# Patient Record
Sex: MALE | Race: WHITE | NOT HISPANIC OR LATINO | Employment: FULL TIME | URBAN - NONMETROPOLITAN AREA
[De-identification: names, ages, dates, MRNs, and addresses within clinical notes are randomized per-mention and may not be internally consistent; named-entity substitution may affect disease eponyms.]

---

## 2022-12-02 ENCOUNTER — HOSPITAL ENCOUNTER (EMERGENCY)
Facility: HOSPITAL | Age: 27
Discharge: HOME | End: 2022-12-02
Attending: NURSE PRACTITIONER | Admitting: NURSE PRACTITIONER
Payer: COMMERCIAL

## 2022-12-02 VITALS
HEART RATE: 65 BPM | TEMPERATURE: 98 F | OXYGEN SATURATION: 96 % | DIASTOLIC BLOOD PRESSURE: 83 MMHG | SYSTOLIC BLOOD PRESSURE: 150 MMHG | RESPIRATION RATE: 16 BRPM

## 2022-12-02 DIAGNOSIS — B34.9 VIRAL SYNDROME: Primary | ICD-10-CM

## 2022-12-02 LAB
FLUAV RNA SPEC QL NAA+PROBE: NEGATIVE
FLUBV RNA RESP QL NAA+PROBE: NEGATIVE
GROUP A STREP BY PCR: NOT DETECTED
RSV RNA SPEC NAA+PROBE: NEGATIVE
SARS-COV-2 RNA RESP QL NAA+PROBE: NEGATIVE

## 2022-12-02 PROCEDURE — 99213 OFFICE O/P EST LOW 20 MIN: CPT | Performed by: NURSE PRACTITIONER

## 2022-12-02 PROCEDURE — 87651 STREP A DNA AMP PROBE: CPT | Performed by: NURSE PRACTITIONER

## 2022-12-02 PROCEDURE — C9803 HOPD COVID-19 SPEC COLLECT: HCPCS

## 2022-12-02 PROCEDURE — G0463 HOSPITAL OUTPT CLINIC VISIT: HCPCS

## 2022-12-02 PROCEDURE — 87637 SARSCOV2&INF A&B&RSV AMP PRB: CPT | Performed by: NURSE PRACTITIONER

## 2022-12-02 ASSESSMENT — ENCOUNTER SYMPTOMS
NAUSEA: 1
DIARRHEA: 1
ABDOMINAL PAIN: 0
HEADACHES: 1
SORE THROAT: 1
CHILLS: 1
PSYCHIATRIC NEGATIVE: 1
RHINORRHEA: 1
MYALGIAS: 1
VOMITING: 1
FEVER: 1
SHORTNESS OF BREATH: 0
COUGH: 1

## 2022-12-02 NOTE — ED TRIAGE NOTES
Past 4 days having diarrhea, liquidly and past 2 days vomiting.  Reports no energy.   Has cough and 101 fever. Reports body aches all over.

## 2022-12-02 NOTE — Clinical Note
Morgan Hampton was seen and treated in our emergency department on 12/2/2022.  He may return to work on 12/05/2022.       If you have any questions or concerns, please don't hesitate to call.      Roula Gibbs, NP

## 2022-12-02 NOTE — ED PROVIDER NOTES
History     Chief Complaint   Patient presents with     Generalized Body Aches     Diarrhea     HPI  Morgan Hampton is a 27 year old male who presents to urgent care today with complaints of fever (resolved), chills, congestion, ear pain, rhinorrhea, sore throat, cough, nausea and vomiting, myalgia and headache which started 4 days ago.  Staying hydrated, normal output.  No rashes.  Wants COVID, influenza and RSV test.  Patient believes his girlfriend's children got him sick as they have similar symptoms.  Needs work note.  No other concerns.    Allergies:  No Known Allergies    Problem List:    There are no problems to display for this patient.       Past Medical History:    No past medical history on file.    Past Surgical History:    No past surgical history on file.    Family History:    No family history on file.    Social History:  Marital Status:  Single [1]        Medications:    No current outpatient medications on file.    Review of Systems   Constitutional: Positive for chills and fever.   HENT: Positive for congestion, ear pain, rhinorrhea and sore throat.    Respiratory: Positive for cough. Negative for shortness of breath.    Cardiovascular: Negative for chest pain.   Gastrointestinal: Positive for diarrhea (resolved), nausea and vomiting. Negative for abdominal pain.   Genitourinary: Negative for decreased urine volume.   Musculoskeletal: Positive for myalgias.   Skin: Negative for rash.   Neurological: Positive for headaches.   Psychiatric/Behavioral: Negative.      Physical Exam   BP: 150/83  Pulse: 65  Temp: 98  F (36.7  C)  Resp: 16  SpO2: 96 %    Physical Exam  Vitals and nursing note reviewed.   Constitutional:       General: He is not in acute distress.     Appearance: Normal appearance. He is not ill-appearing or toxic-appearing.   HENT:      Head: Normocephalic.      Right Ear: Tympanic membrane, ear canal and external ear normal.      Left Ear: Tympanic membrane, ear canal and external  ear normal.      Nose: Congestion and rhinorrhea present.      Mouth/Throat:      Mouth: Mucous membranes are moist.      Pharynx: Oropharynx is clear. Posterior oropharyngeal erythema present. No oropharyngeal exudate.   Cardiovascular:      Rate and Rhythm: Normal rate and regular rhythm.      Pulses: Normal pulses.      Heart sounds: Normal heart sounds.   Pulmonary:      Effort: Pulmonary effort is normal.      Breath sounds: Normal breath sounds.   Abdominal:      General: Bowel sounds are normal.      Palpations: Abdomen is soft.      Tenderness: There is no abdominal tenderness.   Musculoskeletal:      Cervical back: Normal range of motion and neck supple. No rigidity or tenderness.   Lymphadenopathy:      Cervical: Cervical adenopathy present.   Skin:     General: Skin is warm and dry.   Neurological:      Mental Status: He is alert.   Psychiatric:         Mood and Affect: Mood normal.       ED Course     No results found for this or any previous visit (from the past 24 hour(s)).    Medications - No data to display    Assessments & Plan (with Medical Decision Making)     I have reviewed the nursing notes.    I have reviewed the findings, diagnosis, plan and need for follow up with the patient.  (B34.9) Viral syndrome  (primary encounter diagnosis)  Plan:   Patient ambulatory with a nontoxic appearance.  Lungs clear throughout.  No signs of otitis media.  Strep test pending, will notify patient of results via telephone once it results.  Staying hydrated, normal output.  No rashes.  Declines any medication for nausea.  COVID, influenza and RSV test pending.  Symptomatic treatment recommendations provided.  Alternate tylenol and ibuprofen as needed for pain or fever.  Push fluids to stay hydrated.  Follow-up with primary care provider or return to urgent care/ED with any worsening in condition or additional concerns.  Patient in agreement with treatment plan.  Work note given.    New Prescriptions    No  medications on file     Final diagnoses:   Viral syndrome     12/2/2022   [facility]     Roula Gibbs, LACHO  12/02/22 1221

## 2022-12-02 NOTE — DISCHARGE INSTRUCTIONS
Symptomatic treatments recommended.  -Discussed that antibiotics would not help symptoms of viral URI. Education provided on symptoms of secondary bacterial infection such as new fever, chills, rigors, shortness of breath, increased work of breathing, that can occur with viral URI and need for further evaluation, if they occur.   - Ensure you are staying hydrated by drinking plenty of fluids or eating foods such as popsicles, jello, pudding.  - Honey can be soothing for sore throat  - Warm salt water gurgles can help soothe sore throat  - Rest  - Humidifier can help with congestion and help keep mucus membranes such as throat and nose from drying out.  - Sleeping slightly propped up can help with congestion and postnasal drainage that can worsen cough at bedtime.  - As long as you have never been told to take Tylenol and/or Ibuprofen you can use them to manage fever and body aches per package instructions  Make sure you eat when you take ibuprofen to avoid stomach upset.  - OTC cough medications per package instructions to help with cough. Check to see if the cough/cold medication already has acetaminophen (Tylenol) in it. If it does avoid taking additional Tylenol.  - If sudden onset of new fever, worsening symptoms return for further evaluation.  - OTC nasal steroid such as Flonase can help decrease sinus inflammation to help with congestion.  - Education provided on symptoms of post-viral bacterial infections including ear infection and pneumonia. This would require re-evaluation for treatment.    Follow-up with primary care provider or return to urgent care/ED with any worsening in condition or additional concerns

## 2023-12-08 ENCOUNTER — APPOINTMENT (OUTPATIENT)
Dept: OCCUPATIONAL MEDICINE | Facility: OTHER | Age: 28
End: 2023-12-08

## 2023-12-08 PROCEDURE — 99000 SPECIMEN HANDLING OFFICE-LAB: CPT

## 2023-12-08 PROCEDURE — 99499 UNLISTED E&M SERVICE: CPT

## 2023-12-08 PROCEDURE — 99199 UNLISTED SPECIAL SVC PX/RPRT: CPT

## 2023-12-08 PROCEDURE — 92552 PURE TONE AUDIOMETRY AIR: CPT

## 2023-12-08 PROCEDURE — 99080 SPECIAL REPORTS OR FORMS: CPT

## 2023-12-08 PROCEDURE — 92499 UNLISTED OPH SVC/PROCEDURE: CPT

## 2024-03-02 ENCOUNTER — TELEPHONE (OUTPATIENT)
Dept: FAMILY MEDICINE | Facility: OTHER | Age: 29
End: 2024-03-02

## 2024-03-02 DIAGNOSIS — Z20.2 EXPOSURE TO TRICHOMONAS: Primary | ICD-10-CM

## 2024-03-02 RX ORDER — METRONIDAZOLE 500 MG/1
2000 TABLET ORAL ONCE
Qty: 4 TABLET | Refills: 0 | Status: SHIPPED | OUTPATIENT
Start: 2024-03-02 | End: 2024-03-02

## 2024-03-02 NOTE — TELEPHONE ENCOUNTER
Spoke with patient this afternoon in regards to a sexual partner that has tested positive for trichomonas.  Patient states that he was aware of partners results and would like to be treated.  Patient requesting prescription to be sent to the [place] [facility].

## 2024-03-02 NOTE — PATIENT INSTRUCTIONS
"Trichomoniasis: Care Instructions  Overview  Trichomoniasis is a sexually transmitted infection (STI) caused by a parasite. It's often called trich (say \"trick\"). You can get it by having sex with an infected partner. Trich may cause vaginal itching and a smelly discharge. But in many cases, there are no symptoms.  Trich needs to be treated so that you don't spread it to others. Both you and your sex partner(s) should be treated at the same time so you don't infect each other again.  If you're pregnant, your doctor will talk with you about treatment. Trich may cause problems with pregnancy. You could pass it to your baby during a vaginal delivery. But this is rare.  Follow-up care is a key part of your treatment and safety. Be sure to make and go to all appointments, and call your doctor if you are having problems. It's also a good idea to know your test results and keep a list of the medicines you take.  How can you care for yourself at home?  Take your antibiotics as directed. Do not stop taking them just because you feel better. You need to take the full course of antibiotics.  Do not have sex while you are being treated. If your doctor gave you a single dose of antibiotics, do not have sex until one week after both you and your partner or partners have been treated.  Tell your sex partner or partners that they will also need to be tested and treated.  Use a cold water compress or cool baths to relieve itching.  How can you prevent it?  Here are some ways to help prevent STIs.  Limit your sex partners. Sex with one partner who has sex only with you can reduce your risk of getting an STI.  Talk with your partner or partners about STIs before you have sex. Find out if they are at risk for an STI. Remember that it's possible to have an STI and not know it.  Wait to have sex with new partners until you've each been tested.  Don't have sex if you have symptoms of an infection or if you are being treated for an " "STI.  Use a condom every time you have sex. Condoms are the only form of birth control that also helps prevent STIs.  If you had sex without a condom, ask your doctor if taking a preventive medicine is recommended. It may help prevent certain STIs if it's taken within 24 to 72 hours after unprotected sex.  Don't share sex toys. But if you do share them, use a condom and clean the sex toys between each use.  Vaccines are available for some STIs, such as HPV. Ask your doctor for more information.  When should you call for help?   Call your doctor now or seek immediate medical care if:    You have unusual vaginal bleeding.     You have a fever.     You have new discharge from the vagina or penis.     You have pelvic pain.   Watch closely for changes in your health, and be sure to contact your doctor if:    You do not get better as expected.     You have any new symptoms or your symptoms get worse.   Where can you learn more?  Go to https://www.[employer].net/patiented  Enter I633 in the search box to learn more about \"Trichomoniasis: Care Instructions.\"  Current as of: April 19, 2023               Content Version: 13.8    8287-8125 [employer].   Care instructions adapted under license by your healthcare professional. If you have questions about a medical condition or this instruction, always ask your healthcare professional. [employer] disclaims any warranty or liability for your use of this information.      "